# Patient Record
Sex: FEMALE | Race: WHITE
[De-identification: names, ages, dates, MRNs, and addresses within clinical notes are randomized per-mention and may not be internally consistent; named-entity substitution may affect disease eponyms.]

---

## 2017-09-22 ENCOUNTER — HOSPITAL ENCOUNTER (OUTPATIENT)
Dept: HOSPITAL 53 - M RADPRO | Age: 62
End: 2017-09-22
Attending: NURSE PRACTITIONER
Payer: COMMERCIAL

## 2017-09-22 DIAGNOSIS — Z88.8: ICD-10-CM

## 2017-09-22 DIAGNOSIS — Z72.0: ICD-10-CM

## 2017-09-22 DIAGNOSIS — Z91.018: ICD-10-CM

## 2017-09-22 DIAGNOSIS — R74.9: Primary | ICD-10-CM

## 2017-09-22 DIAGNOSIS — Z79.899: ICD-10-CM

## 2017-09-22 NOTE — REP
Ultrasound-guided liver biopsy

 

The procedure was performed under the direct supervision of Dr. Wyman.

 

The risks and benefits of the procedure were explained to the patient and

informed consent was obtained.

 

The left lobe of the liver was localized using ultrasound guidance.  The skin was

prepped and draped in a sterile fashion.  1% Xylocaine was used as a local

anesthetic.  Using ultrasound guidance a 19/20 gauge coaxial needle biopsy system

was inserted and advanced into the liver.  Four core biopsy samples were obtained

and sent to lab.  Post biopsy ultrasound images demonstrate a small 2.4 cm

hematoma at the liver capsule.  Pressure was held at the site for approximately 5

minutes.  Follow-up ultrasound images show no change in the size of the.

Initially the patient's pain with a 9/10.  After the 5-minute interval the

patient's pain went to 5 out of 10. approximately a 1/2 hour before she was

discharged her pain dropped to 2 out of 10.

 

The patient tolerated the procedure well and there were no immediate

complications.  After the appropriate amount of monitored convalescence the

patient was discharged from the department.

 

 

Reviewed by

ALVARADO Feldman 09/22/2017 02:59 PSigned by

Jeffrey Wyman MD 09/22/2017 07:20 P

## 2018-10-29 ENCOUNTER — HOSPITAL ENCOUNTER (OUTPATIENT)
Dept: HOSPITAL 53 - M CARPUL | Age: 63
End: 2018-10-29
Attending: INTERNAL MEDICINE
Payer: COMMERCIAL

## 2018-10-29 DIAGNOSIS — M34.1: Primary | ICD-10-CM

## 2018-10-29 DIAGNOSIS — I27.20: ICD-10-CM

## 2018-10-29 PROCEDURE — 94060 EVALUATION OF WHEEZING: CPT

## 2018-10-29 PROCEDURE — 93306 TTE W/DOPPLER COMPLETE: CPT
